# Patient Record
Sex: MALE | Race: WHITE | NOT HISPANIC OR LATINO | Employment: OTHER | URBAN - METROPOLITAN AREA
[De-identification: names, ages, dates, MRNs, and addresses within clinical notes are randomized per-mention and may not be internally consistent; named-entity substitution may affect disease eponyms.]

---

## 2017-05-05 ENCOUNTER — PATIENT OUTREACH (OUTPATIENT)
Dept: HEALTH INFORMATION MANAGEMENT | Facility: OTHER | Age: 72
End: 2017-05-05

## 2017-05-05 NOTE — PROGRESS NOTES
5/5/17     Verify PCP: yes    Communication Preference Obtained: yes     Review Care Team: yes    Annual Wellness Visit Scheduling  1. Scheduling Status:Not Scheduled. Patient states they are no longer with PCP. [facility]         MyChart Activation: already active

## 2017-07-20 ENCOUNTER — SLEEP CENTER VISIT (OUTPATIENT)
Dept: SLEEP MEDICINE | Facility: MEDICAL CENTER | Age: 72
End: 2017-07-20
Payer: MEDICARE

## 2017-07-20 VITALS
BODY MASS INDEX: 27.09 KG/M2 | DIASTOLIC BLOOD PRESSURE: 90 MMHG | HEART RATE: 77 BPM | RESPIRATION RATE: 16 BRPM | HEIGHT: 72 IN | SYSTOLIC BLOOD PRESSURE: 162 MMHG | OXYGEN SATURATION: 95 % | WEIGHT: 200 LBS

## 2017-07-20 DIAGNOSIS — G47.33 OSA (OBSTRUCTIVE SLEEP APNEA): ICD-10-CM

## 2017-07-20 DIAGNOSIS — I10 ESSENTIAL HYPERTENSION: ICD-10-CM

## 2017-07-20 PROCEDURE — 99203 OFFICE O/P NEW LOW 30 MIN: CPT | Performed by: NURSE PRACTITIONER

## 2017-07-20 NOTE — PROGRESS NOTES
Chief Complaint   Patient presents with   • Establish Care     [facility]        HPI:  Glen Chisholm II is a 72 y.o. year old male here today to establish care for his sleep apnea. He states he [facility] which indicated evidence of sleep apnea. However, I do not have records of his sleep study to review. He does snore at night. He is unsure of witnessed apneas as he lives alone. He notes occasional waking gasping for air. He tends to fall asleep easily. He does use Melatonin OTC. He does wake up 1-2 times to urinate. He goes to bed and 9 pm and wakes at 5 am. He tends to take an afternoon nap. He tends to wake un refreshed. He is tired during the day. He admits to nodding off when sedentary. He denies any morning headaches. He states he has had these symptoms for the past 7 years. He states he has chronic back pain and is pending back surgery. He states he was recommended a sleep apnea evaluation prior to surgery.   He denies chest pain. [facility]. He had an unremarkable holter monitor. He states he cut back on caffeine and his palpitations have improved. He does have a history of hypertension and is on medication for this. He has a history of tobacco abuse and quit smoking in 1984. He notes mild dyspnea with stairs. He denies current cough, mucous or wheeze. He states he has been told he has COPD, but is unsure of whether or not he has had lung function testing.       Past Medical History   Diagnosis Date   • Esophageal stricture    • Liver cyst    • Hearing loss      low frequency   • Tinnitus    • Dizziness      orthostatic   • Herpes simplex type 1 infection    • Hiatal hernia    • Hypertension    • Indigestion    • Sleep apnea    • Snoring    • Low back pain 3/27/12     5/10   • Pain      Left Shoulder   • Heart burn    • Cataract    • Thyroid disease    • Arthritis        Past Surgical History   Procedure Laterality Date   •  Cyst excision       ganglion   • Tonsillectomy     • Esophagus dilation-balloon >30       multiple   • Eye surgery       right weak eye muscles ( two at age 4 and one at age 25)   • Debridement  8/17/2009     Performed by BOONE EDWARDS at SURGERY SAME DAY [facility] ORS   • Lumbar decompression  4/5/2012     Performed by ARAMIS MATTHEWS at SURGERY [facility] ORS   • Lumbar laminectomy diskectomy  4/5/2012     Performed by ARAMIS MATTHEWS at SURGERY [facility] ORS   • Shoulder surgery  1999     right prosthesis   • Shoulder surgery  1966     right   • Shoulder surgery  1982     right   • Shoulder arthroscopy  8/17/2009     Performed by BOONE EDWARDS at SURGERY SAME DAY [facility] ORS   • Shoulder decompression  8/17/2009     Performed by BOONE EDWARDS at SURGERY SAME DAY [facility] ORS   • Rectus repair  8/8/2014     Performed by COTY Banks M.D. at SURGERY SURGICAL [facility] [place] ORS       Family History   Problem Relation Age of Onset   • Hypertension Mother    • Hyperlipidemia Sister    • Arthritis Sister      lupus   • Heart Disease Father      AAA or renal/iliac artery aneurysms   • Heart Disease Paternal Aunt      AAA or renal/iliac artery aneurysms   • Heart Disease Maternal Grandfather      AAA or renal/iliac artery aneurysms   • Heart Disease Maternal Grandmother    • Heart Disease Paternal Grandfather        Social History     Social History   • Marital Status: Single     Spouse Name: N/A   • Number of Children: N/A   • Years of Education: N/A     Occupational History   • Not on file.     Social History Main Topics   • Smoking status: Former Smoker -- 1.00 packs/day for 14 years     Types: Cigarettes     Quit date: 01/01/1984   • Smokeless tobacco: Never Used   • Alcohol Use: Yes      Comment: occ   • Drug Use: No   • Sexual Activity: Not on file     Other Topics Concern   • Not on file     Social History Narrative       ROS:  Constitutional: Denies fevers, chills, sweats, weight loss  Eyes: Denies vision loss,  pain, drainage, double vision. Wears glasses   Ears/Nose/Mouth/Throat: Denies rhinitis, nasal congestion, ear ache, difficulty hearing, sore throat, persistent hoarseness, decayed teeth/toothache  Cardiovascular: Denies chest pain, tightness, swelling in feet/legs, fainting, difficulty breathing when laying down. Positive palpitations   Respiratory: See HPI   GI: Denies heartburn, difficulty swallowing, nausea, vomiting, abdominal pain, diarrhea, constipation  : Denies frequent urination, painful urination  Integumentary: Denies rashes, lumps or color changes  MSK: Positive for back pain   Neurological: Denies frequent headaches, dizziness, weakness  Sleep: See HPI       Current Outpatient Prescriptions on File Prior to Visit   Medication Sig Dispense Refill   • meclizine (ANTIVERT) 25 MG Tab Take 1 Tab by mouth 3 times a day as needed. 30 Tab 0   • Esomeprazole Magnesium 20 MG Pack Take 20 mg by mouth every day.     • terazosin (HYTRIN) 2 MG Cap TAKE 1 CAPSULE ORALLY EVERY BEDTIME 90 Cap 3   • levothyroxine (SYNTHROID) 25 MCG Tab TAKE 1 TABLET BY MOUTH EVERY DAY. 90 Tab 3   • gabapentin (NEURONTIN) 300 MG CAPS Take 300 Caps by mouth 2 Times a Day.     • tramadol (ULTRAM) 50 MG TABS Take 50 mg by mouth every 8 hours as needed.     • acyclovir (ZOVIRAX) 400 MG tablet Take 1 Tab by mouth 2 times a day. 180 Tab 6   • CO Q 10 10 MG PO CAPS Take 1 Tab by mouth every day.       No current facility-administered medications on file prior to visit.     Codeine    Blood pressure 162/90, pulse 77, resp. rate 16, height 1.829 m (6'), weight 90.719 kg (200 lb), SpO2 95 %.  PE:   Appearance: Well developed, well nourished, no acute distress  Eyes: PERRL, EOM intact, sclera white, conjunctiva moist  Ears: no lesions or deformities  Hearing: grossly intact  Nose: no lesions or deformities  Oropharynx: tongue normal, posterior pharynx without erythema or exudate  Mallampati Classification: class 1   Neck: supple, trachea midline,  no masses   Respiratory effort: no intercostal retractions or use of accessory muscles  Lung auscultation: no rales, rhonchi or wheezes  Heart auscultation: no murmur rub or gallop  Extremities: no cyanosis or edema  Abdomen: soft ,non tender, no masses  Gait and Station: normal  Digits and nails: no clubbing, cyanosis, petechiae or nodes.  Cranial nerves: grossly intact  Skin: no rashes, lesions or ulcers noted  Orientation: Oriented to time, person and place  Mood and affect: mood and affect appropriate, normal interaction with examiner  Judgement: Intact          Assessment:  1. TORITO (obstructive sleep apnea)     2. Essential hypertension           Plan:    1) He states [facility] which indicated sleep [facility]nea. However, I do not have results of this. I have requested records from the [facility] which are still pending. He believes he has a copy of the study at home and will fax it to us. I did discuss the Pathophysiology of sleep apnea in detail including various treatment options. He is not interested in surgery. If he does have sleep apnea on his study then a trial of Auto CPAP would be recommended. Once records from the [facility] are received then we can review results over the phone and order treatment if indicated.   2) Sleep hygiene discussed in detail.  3) He had an unremarkable holter monitor. He states his palpitations have been better controlled with decreasing his caffeine intake. His BP was elevated today in the office, but he feels this is related to stress. He was unable to find the office for today's appointment and was late. He is on antihypertensives and is followed by Cardiology.  4) Arrange 6 week follow up, sooner if needed. If he does getting started on airway pressurization then he is encouraged to bring his chip to his next office visit.  5) Also request records in regards to his COPD work up. He states he was told he may have COPD. However, it is unclear of whether or not he has had  PFT's or chest imaging.           Answers for HPI/ROS submitted by the patient on 7/6/2017   Year of your last physical exam: 2017  Results of exam: Suffer from sleep apnea; have microscopic amount of blood in urine; may suffer from COPD  Occupation : Retired  Height: 6-0  Current weight: 207  6 months ago: 213  At age 18: 165  What is the reason for your visit today?: Sleep apnea  Name of person referring you to the Sleep Center: [facility]/Dr. Muñoz  Have you ever been hospitalized?: Yes  Reason, year, and hospital in which you were hospitalized:: [facility] has records  Have you ever had problems with anesthesia?: No  Have you experienced post-operative delirium?: No  Any complications with surgery?: No  What year did you receive your last Flu shot?: 2016  What year did you receive you last Pneumonia shot?: 2016  Have you had a TB skin test? If so, please list the year and result:: Not that I am aware  Have you had Allergy skin testing? If so, please list the year and result:: Age 8/Allergic to tomatoes then  but not now.  Please briefly describe your sleep problem and how old you were when it began.: I sleep restless @ night. During the afternoon, I feel fatigued and have to have a nap.  How does this affect your daily life and activities? Please also rate how serious of a problem this is (1 = Not at all, 10 = Very Serious).: 7  Have you had any previous evaluations, examinations, or treatment for this sleep problem or any other problems with your sleep? If so, please describe the evaluation, treatment, and results.: [facility]. Results of 533 minute study:  18 obstruftive & 12 mixed apneas; 28 obstructivehypopneas for apnea-hypopnea index of 6.5; max. heart rate of 110; hypertension & hyperlipidemia, etc.  Have you used any medications (prescribed or otherwise) to help your sleep problem? If yes, include name, amount, frequency, and the prescribing  physician.: Melatonin every night  & Sleep Aid occasionally  If employed, what time do you usually start and end work?: N/A  Do you ever change work shifts? If yes, describe how often (never, infrequently, regularly).: N/A  What time do you usually go to bed and wake up on: Weekdays? Weekends?: 9:00 pm to 5:00 am  Do you have a regular bed partner?: No  How many minutes does it usually take to fall asleep at night after turning off the lights?: Less than 10 minutes  What do you ordinarily do just prior to turning out the lights and attempting to go to sleep (e.g., reading, TV, baths, etc.)?: Watch TV  On average, how many times do you wake up during the night?: 2 to 3 times  On average, how many times do you wake up to use the bathroom?: Once  Do you often wake up too early in the morning and are unable to return to sleep?: Yes  On average, how many hours of sleep do you get per night?: 8  How do you usually awaken?  Alarm, spontaneously, or other?: Spontaneous  Is it difficult for you to awaken and get out of bed after sleeping? (Not at all, Sometimes, Very): Sometimes  Do you nap or return to bed after arising?: Yes  Are you bothered by sleepiness during the day?: Yes  Do you feel that you get too much sleep at night?: No  Do you feel that you get too little sleep at night?: Yes  Do you usually feel tired during the day? If so, what do you attribute this to?: Yes  Do you find yourself falling asleep when you don't mean to? : Yesj  If yes, how long does your sleep episode last?: 20 to 30 minutes  Do you feel rested or refreshed after the sleep episode?: Yes  Have you ever suddenly fallen?: Yes  Have you ever experienced sudden body weakness?: Yes  If yes, were you aware of the things around you?: Yes  Was the weakness brought on by any particular event or feeling? If so, briefly describe.: I have PVC's. I think it may be caused by drinking too much coffee (per Internet), so I cut back on coffee consumptions. I  "haven't had any light-headed moments since, but it has only been a couple months since cutting back on coffee.   Have you ever experienced weakness or paralysis upon going to sleep?: No  Have you ever experienced weakness or paralysis upon awakening from sleep?: No  Have you ever experienced seeing things or hearing voices/noises: That weren't real? On going to sleep? During the night? On awakening from sleep? During the day?: No  Do you have difficulty breathing at night? If yes, briefly describe.: About every 6 months, I wake up gasping for air.  How many times per week?: 1 every 6 months  How did you become aware of this, at what age did this first occur, and how many years has this occurred?: Age 66  Have you been told you snore while asleep? If so, does it disturb a bed partner (or someone in the same room), or someone in the next room?: Never been told that, but I live alone. The sleep study of June 21st, 2017 revealed that I do snore.  Have you ever experienced doing something without being aware of the action? If yes, please describe.: No  How many times per week does this occur?: N/A  Have you ever experienced upon lying in bed before sleep or on awakening from sleep: Restlessness of legs, \"nervous legs\", \"creeping crawling\" sensation of legs, or twitching of legs?: I suffer from neuropathy in both my feet. Feet are very numb, and there is slighter numbness all the way up to my knees. I have never suffered the other symptons; neuropathy is bad enough.  How many times per week does this occur, and how many minutes does the sensation last?: Neuropathy is permanent since laminectomy surgery in approx. 2010. Please confirm date, as surgery occurred @ [facility].  Does anything relieve the sensations (e.g., getting out of bed, medication, massage)?: Yes  At what age did this first occur, and how many years has this occurred?: Last answer relates only to neuropathy.  Walking around for a while helps out.  Have you " ever been told that your arms or legs jerk or twitch while you are asleep? If yes, how many times per night does this occur?: No  At what age did this first occur, and how many years has this occurred?: N/A  Do you know, or have you ever been told that you do any of the following while sleeping: talk, walk, grit teeth, wet the bed, wake up screaming or seemingly afraid, have disturbing dreams, have unusual movements, wake up with headaches, (males) have erections? If yes to any of these, please indicate how many times per week, age started, last occurrence, treatment received.: Not aware of any of these actions.  Has anyone in your family been known to have any sleep problems? If yes, please list the type of problem (e.g., trouble getting to sleep, too sleepy, bed wetting, etc.), the relationship of this person to you, and the treatment received.: Unknown. I don't have much family.      Answers for HPI/ROS submitted by the patient on 7/6/2017   Year of your last physical exam: 2017  Results of exam: Suffer from sleep apnea; have microscopic amount of blood in urine; may suffer from COPD  Occupation : Retired  Height: 6-0  Current weight: 207  6 months ago: 213  At age 18: 165  What is the reason for your visit today?: Sleep apnea  Name of person referring you to the Sleep Center: [facility]/Dr. Muñoz  Have you ever been hospitalized?: Yes  Reason, year, and hospital in which you were hospitalized:: [facility] has records  Have you ever had problems with anesthesia?: No  Have you experienced post-operative delirium?: No  Any complications with surgery?: No  What year did you receive your last Flu shot?: 2016  What year did you receive you last Pneumonia shot?: 2016  Have you had a TB skin test? If so, please list the year and result:: Not that I am aware  Have you had Allergy skin testing? If so, please list the year and result:: Age 8/Allergic to tomatoes then  but not now.  Please briefly describe  your sleep problem and how old you were when it began.: I sleep restless @ night. During the afternoon, I feel fatigued and have to have a nap.  How does this affect your daily life and activities? Please also rate how serious of a problem this is (1 = Not at all, 10 = Very Serious).: 7  Have you had any previous evaluations, examinations, or treatment for this sleep problem or any other problems with your sleep? If so, please describe the evaluation, treatment, and results.: [facility]. Results of 533 minute study:  18 obstruftive & 12 mixed apneas; 28 obstructivehypopneas for apnea-hypopnea index of 6.5; max. heart rate of 110; hypertension & hyperlipidemia, etc.  Have you used any medications (prescribed or otherwise) to help your sleep problem? If yes, include name, amount, frequency, and the prescribing physician.: Melatonin every night  & Sleep Aid occasionally  If employed, what time do you usually start and end work?: N/A  Do you ever change work shifts? If yes, describe how often (never, infrequently, regularly).: N/A  What time do you usually go to bed and wake up on: Weekdays? Weekends?: 9:00 pm to 5:00 am  Do you have a regular bed partner?: No  How many minutes does it usually take to fall asleep at night after turning off the lights?: Less than 10 minutes  What do you ordinarily do just prior to turning out the lights and attempting to go to sleep (e.g., reading, TV, baths, etc.)?: Watch TV  On average, how many times do you wake up during the night?: 2 to 3 times  On average, how many times do you wake up to use the bathroom?: Once  Do you often wake up too early in the morning and are unable to return to sleep?: Yes  On average, how many hours of sleep do you get per night?: 8  How do you usually awaken?  Alarm, spontaneously, or other?: Spontaneous  Is it difficult for you to awaken and get out of bed after sleeping? (Not at all, Sometimes, Very): Sometimes  Do  you nap or return to bed after arising?: Yes  Are you bothered by sleepiness during the day?: Yes  Do you feel that you get too much sleep at night?: No  Do you feel that you get too little sleep at night?: Yes  Do you usually feel tired during the day? If so, what do you attribute this to?: Yes  Do you find yourself falling asleep when you don't mean to? : Yesj  If yes, how long does your sleep episode last?: 20 to 30 minutes  Do you feel rested or refreshed after the sleep episode?: Yes  Have you ever suddenly fallen?: Yes  Have you ever experienced sudden body weakness?: Yes  If yes, were you aware of the things around you?: Yes  Was the weakness brought on by any particular event or feeling? If so, briefly describe.: I have PVC's. I think it may be caused by drinking too much coffee (per Internet), so I cut back on coffee consumptions. I haven't had any light-headed moments since, but it has only been a couple months since cutting back on coffee.   Have you ever experienced weakness or paralysis upon going to sleep?: No  Have you ever experienced weakness or paralysis upon awakening from sleep?: No  Have you ever experienced seeing things or hearing voices/noises: That weren't real? On going to sleep? During the night? On awakening from sleep? During the day?: No  Do you have difficulty breathing at night? If yes, briefly describe.: About every 6 months, I wake up gasping for air.  How many times per week?: 1 every 6 months  How did you become aware of this, at what age did this first occur, and how many years has this occurred?: Age 66  Have you been told you snore while asleep? If so, does it disturb a bed partner (or someone in the same room), or someone in the next room?: Never been told that, but I live alone. The sleep study of June 21st, 2017 revealed that I do snore.  Have you ever experienced doing something without being aware of the action? If yes, please describe.: No  How many times per week does  "this occur?: N/A  Have you ever experienced upon lying in bed before sleep or on awakening from sleep: Restlessness of legs, \"nervous legs\", \"creeping crawling\" sensation of legs, or twitching of legs?: I suffer from neuropathy in both my feet. Feet are very numb, and there is slighter numbness all the way up to my knees. I have never suffered the other symptons; neuropathy is bad enough.  How many times per week does this occur, and how many minutes does the sensation last?: Neuropathy is permanent since laminectomy surgery in approx. 2010. Please confirm date, as surgery occurred @ [facility].  Does anything relieve the sensations (e.g., getting out of bed, medication, massage)?: Yes  At what age did this first occur, and how many years has this occurred?: Last answer relates only to neuropathy.  Walking around for a while helps out.  Have you ever been told that your arms or legs jerk or twitch while you are asleep? If yes, how many times per night does this occur?: No  At what age did this first occur, and how many years has this occurred?: N/A  Do you know, or have you ever been told that you do any of the following while sleeping: talk, walk, grit teeth, wet the bed, wake up screaming or seemingly afraid, have disturbing dreams, have unusual movements, wake up with headaches, (males) have erections? If yes to any of these, please indicate how many times per week, age started, last occurrence, treatment received.: Not aware of any of these actions.  Has anyone in your family been known to have any sleep problems? If yes, please list the type of problem (e.g., trouble getting to sleep, too sleepy, bed wetting, etc.), the relationship of this person to you, and the treatment received.: Unknown. I don't have much family.      "

## 2017-07-20 NOTE — MR AVS SNAPSHOT
Glen Chisholm II   2017 9:40 AM   Sleep Center Visit   MRN: [id number]    Department:  [facility]   Dept Phone:  [phone]    Description:  Male : 1945   Provider:  EWA Spicer           Reason for Visit     [facility]       Allergies as of 2017     Allergen Noted Reactions    Codeine 2009   Nausea    Not a true allergy  Tolerated Oxycodone 14       You were diagnosed with     TORITO (obstructive sleep apnea)   [920051]       Essential hypertension   [6498740]         Vital Signs     Blood Pressure Pulse Respirations Height Weight Body Mass Index    162/90 mmHg 77 16 1.829 m (6') 90.719 kg (200 lb) 27.12 kg/m2    Oxygen Saturation Smoking Status                95% Former Smoker          Basic Information     Date Of Birth Sex Race Ethnicity Preferred Language    1945 Male White Non- English      Problem List              ICD-10-CM Priority Class Noted - Resolved    Health maintenance examination Z00.00   2009 - Present    Peripheral neuropathy G62.9   2010 - Present    Low back pain M54.5   2010 - Present    Hyperlipidemia with target LDL less than 100 E78.5   2010 - Present    Hypertropia [id number]   2014 - Present    Hypothyroid E03.9   2015 - Present    Cataract H26.9   2015 - Present    Dehydration E86.0   2015 - Present    Cephalgia R51   2015 - Present    Vertigo R42   2015 - Present    TORITO (obstructive sleep apnea) G47.33   2017 - Present      Health Maintenance        Date Due Completion Dates    COLONOSCOPY 1993 ---    IMM ZOSTER VACCINE 2003 ---    IMM PNEUMOCOCCAL 65+ (ADULT) LOW/MEDIUM RISK SERIES (1 of 2 - PCV13) 2008 ---    IMM INFLUENZA (1) 2017 ---    IMM DTaP/Tdap/Td Vaccine (2 - Td) 2022            Current Immunizations     Tdap Vaccine 2012      Below and/or attached are the medications your provider  expects you to take. Review all of your home medications and newly ordered medications with your provider and/or pharmacist. Follow medication instructions as directed by your provider and/or pharmacist. Please keep your medication list with you and share with your provider. Update the information when medications are discontinued, doses are changed, or new medications (including over-the-counter products) are added; and carry medication information at all times in the event of emergency situations     Allergies:  CODEINE - Nausea               Medications  Valid as of: July 20, 2017 - 10:26 AM    Generic Name Brand Name Tablet Size Instructions for use    Acyclovir (Tab) ZOVIRAX 400 MG Take 1 Tab by mouth 2 times a day.        Coenzyme Q10 (Cap) Co Q 10 10 MG Take 1 Tab by mouth every day.        Esomeprazole Magnesium (Pack) Esomeprazole Magnesium 20 MG Take 20 mg by mouth every day.        Gabapentin (Cap) NEURONTIN 300 MG Take 300 Caps by mouth 2 Times a Day.        Levothyroxine Sodium (Tab) SYNTHROID 25 MCG TAKE 1 TABLET BY MOUTH EVERY DAY.        Meclizine HCl (Tab) ANTIVERT 25 MG Take 1 Tab by mouth 3 times a day as needed.        Terazosin HCl (Cap) HYTRIN 2 MG TAKE 1 CAPSULE ORALLY EVERY BEDTIME        TraMADol HCl (Tab) ULTRAM 50 MG Take 50 mg by mouth every 8 hours as needed.        .                 Medicines prescribed today were sent to:     [id number] - [place] - [address]    [address] [place] [address]    Phone: [phone] Fax: [phone]    Open 24 Hours?: No      Medication refill instructions:       If your prescription bottle indicates you have medication refills left, it is not necessary to call your provider’s office. Please contact your pharmacy and they will refill your medication.    If your prescription bottle indicates you do not have any refills left, you may request refills at any time through one of the following ways: The online [employer] system (except  Urgent Care), by calling your provider’s office, or by asking your pharmacy to contact your provider’s office with a refill request. Medication refills are processed only during regular business hours and may not be available until the next business day. Your provider may request additional information or to have a follow-up visit with you prior to refilling your medication.   *Please Note: Medication refills are assigned a new Rx number when refilled electronically. Your pharmacy may indicate that no refills were authorized even though a new prescription for the same medication is available at the pharmacy. Please request the medicine by name with the pharmacy before contacting your provider for a refill.           [employer] Access Code: Activation code not generated  Current [employer] Status: Active

## 2017-07-21 ENCOUNTER — TELEPHONE (OUTPATIENT)
Dept: SLEEP MEDICINE | Facility: MEDICAL CENTER | Age: 72
End: 2017-07-21

## 2017-07-21 NOTE — PATIENT INSTRUCTIONS
Plan:    1) He states [facility] which indicated [facility] apnea. However, I do not have results of this. I have requested [facility] the [facility] which are still pending. He believes he has a copy of the study at home and will fax it to us. I did discuss the Pathophysiology of sleep apnea in detail including various treatment options. He is not interested in surgery. If he does have sleep apnea on his study then a trial of Auto CPAP would be recommended. Once [facility] the [facility] are received then we can review results over the phone and order treatment if indicated.   2) Sleep hygiene discussed in detail.  3) He had an unremarkable holter monitor. He states his palpitations have been better controlled with decreasing his caffeine intake. His BP was elevated today in the office, but he feels this is related to stress. He was unable to find the office for today's appointment and was late. He is on antihypertensives and is followed by Cardiology.  4) Arrange 6 week follow up, sooner if needed. If he does getting started on airway pressurization then he is encouraged to bring his chip to his next office visit.  5) Also request records in regards to his COPD work up. He states he was told he may have COPD. However, it is unclear of whether or not he has had PFT's or chest imaging.

## 2017-07-21 NOTE — TELEPHONE ENCOUNTER
Patient states he was going to fax a copy of his [facility] and I would review with him on the phone. Ester also requested a copy [facility] the [facility] when he was seen in the office. However, I still do not have a copy of his study. Could you check to see if there is a fax at the Pulmonary office from his sleep study?

## 2017-07-24 NOTE — TELEPHONE ENCOUNTER
T/C with patient.   I spoke with Glen regarding results of his home sleep [facility] which indicates mild sleep apnea with an AHI of 6.5 with a low 02 saturation of 87%. Events were increased in supine. Records indicate that he was ordered a [facility]AP through the [facility]. He states he would rather get his machine and follow [facility] through [facility]. [facility] to [facility]. He does not need to see us back routinely. He will call us if he wants to follow with us in the future.

## 2017-10-30 ENCOUNTER — APPOINTMENT (RX ONLY)
Dept: URBAN - METROPOLITAN AREA CLINIC 20 | Facility: CLINIC | Age: 72
Setting detail: DERMATOLOGY
End: 2017-10-30

## 2017-10-30 DIAGNOSIS — L82.1 OTHER SEBORRHEIC KERATOSIS: ICD-10-CM

## 2017-10-30 DIAGNOSIS — L98.8 OTHER SPECIFIED DISORDERS OF THE SKIN AND SUBCUTANEOUS TISSUE: ICD-10-CM

## 2017-10-30 DIAGNOSIS — L91.8 OTHER HYPERTROPHIC DISORDERS OF THE SKIN: ICD-10-CM

## 2017-10-30 DIAGNOSIS — L57.0 ACTINIC KERATOSIS: ICD-10-CM

## 2017-10-30 DIAGNOSIS — D485 NEOPLASM OF UNCERTAIN BEHAVIOR OF SKIN: ICD-10-CM

## 2017-10-30 DIAGNOSIS — D18.0 HEMANGIOMA: ICD-10-CM

## 2017-10-30 DIAGNOSIS — L81.4 OTHER MELANIN HYPERPIGMENTATION: ICD-10-CM

## 2017-10-30 PROBLEM — D48.5 NEOPLASM OF UNCERTAIN BEHAVIOR OF SKIN: Status: ACTIVE | Noted: 2017-10-30

## 2017-10-30 PROBLEM — D18.01 HEMANGIOMA OF SKIN AND SUBCUTANEOUS TISSUE: Status: ACTIVE | Noted: 2017-10-30

## 2017-10-30 PROBLEM — E78.5 HYPERLIPIDEMIA, UNSPECIFIED: Status: ACTIVE | Noted: 2017-10-30

## 2017-10-30 PROCEDURE — 69100 BIOPSY OF EXTERNAL EAR: CPT | Mod: 59

## 2017-10-30 PROCEDURE — 99202 OFFICE O/P NEW SF 15 MIN: CPT | Mod: 25

## 2017-10-30 PROCEDURE — ? BIOPSY BY SHAVE METHOD

## 2017-10-30 PROCEDURE — ? LIQUID NITROGEN

## 2017-10-30 PROCEDURE — ? BENIGN DESTRUCTION

## 2017-10-30 PROCEDURE — ? COUNSELING

## 2017-10-30 ASSESSMENT — LOCATION DETAILED DESCRIPTION DERM
LOCATION DETAILED: LEFT MEDIAL SUPERIOR CHEST
LOCATION DETAILED: RIGHT INFERIOR POSTERIOR HELIX
LOCATION DETAILED: SUPERIOR THORACIC SPINE
LOCATION DETAILED: LEFT MEDIAL HEEL
LOCATION DETAILED: LEFT INFERIOR UPPER BACK
LOCATION DETAILED: RIGHT INFERIOR UPPER BACK
LOCATION DETAILED: STERNUM
LOCATION DETAILED: RIGHT CENTRAL POSTERIOR NECK
LOCATION DETAILED: RIGHT MEDIAL HEEL
LOCATION DETAILED: RIGHT INFERIOR VERMILION LIP
LOCATION DETAILED: NASAL DORSUM
LOCATION DETAILED: RIGHT SUPERIOR PARIETAL SCALP
LOCATION DETAILED: RIGHT MEDIAL SUPERIOR CHEST

## 2017-10-30 ASSESSMENT — LOCATION ZONE DERM
LOCATION ZONE: EAR
LOCATION ZONE: NECK
LOCATION ZONE: FEET
LOCATION ZONE: LIP
LOCATION ZONE: TRUNK
LOCATION ZONE: NOSE
LOCATION ZONE: SCALP

## 2017-10-30 ASSESSMENT — LOCATION SIMPLE DESCRIPTION DERM
LOCATION SIMPLE: RIGHT UPPER BACK
LOCATION SIMPLE: CHEST
LOCATION SIMPLE: UPPER BACK
LOCATION SIMPLE: NECK
LOCATION SIMPLE: LEFT UPPER BACK
LOCATION SIMPLE: RIGHT EAR
LOCATION SIMPLE: SCALP
LOCATION SIMPLE: LEFT FOOT
LOCATION SIMPLE: RIGHT FOOT
LOCATION SIMPLE: NOSE
LOCATION SIMPLE: RIGHT LIP

## 2017-10-30 NOTE — PROCEDURE: BIOPSY BY SHAVE METHOD
Anesthesia Volume In Cc: 0.5
Bill For Surgical Tray: no
Hemostasis: Drysol and Electrocautery
Cryotherapy Text: The wound bed was treated with cryotherapy after the biopsy was performed.
Silver Nitrate Text: The wound bed was treated with silver nitrate after the biopsy was performed.
Additional Anesthesia Volume In Cc (Will Not Render If 0): 0
Detail Level: Detailed
Biopsy Type: H and E
Notification Instructions: Patient will be notified of biopsy results. However, patient instructed to call the office if not contacted within 2 weeks.
Electrodesiccation Text: The wound bed was treated with electrodesiccation after the biopsy was performed.
Anesthesia Type: 1% lidocaine with 1:100,000 epinephrine and 408mcg clindamycin/ml and a 1:10 solution of 8.4% sodium bicarbonate
Lab Facility: 
Type Of Destruction Used: Curettage
Dressing: Band-Aid
Biopsy Method: Personna blade
Consent: Written consent was obtained and risks were reviewed including but not limited to scarring, infection, bleeding, scabbing, incomplete removal, nerve damage and allergy to anesthesia.
Lab: [id number]
Billing Type: Third-Party Bill
Curettage Text: The wound bed was treated with curettage after the biopsy was performed.
Wound Care: Vaseline
Electrodesiccation And Curettage Text: The wound bed was treated with electrodesiccation and curettage after the biopsy was performed.
Post-Care Instructions: I reviewed with the patient in detail post-care instructions. Patient is to keep the biopsy site dry overnight, and then apply bacitracin twice daily until healed. Patient may apply hydrogen peroxide soaks to remove any crusting.

## 2017-10-30 NOTE — PROCEDURE: LIQUID NITROGEN
Number Of Freeze-Thaw Cycles: 1 freeze-thaw cycle
Duration Of Freeze Thaw-Cycle (Seconds): 3
Render Post-Care Instructions In Note?: no
Detail Level: Detailed
Post-Care Instructions: I reviewed with the patient in detail post-care instructions. Patient is to wear sunprotection, and avoid picking at any of the treated lesions. Pt may apply Vaseline to crusted or scabbing areas.
Consent: The patient's consent was obtained including but not limited to risks of crusting, scabbing, blistering, scarring, darker or lighter pigmentary change, recurrence, incomplete removal and infection.

## 2017-10-30 NOTE — HPI: SKIN CHECK
What Is The Reason For Today's Visit?: Skin Lesions
Additional History: Would like back looked at, has had LN2 with previous PCP, [facility]. \\nCheck feet, has cracking and dryness.

## 2017-10-30 NOTE — PROCEDURE: BENIGN DESTRUCTION
Anesthesia Volume In Cc: 0.5
Medical Necessity Clause: This procedure was medically necessary because the lesions that were treated were:
Bill Insurance (You Assume Risk Of Denial Or Audit By Selecting Yes): No
Medical Necessity Information: It is in your best interest to select a reason for this procedure from the list below. All of these items fulfill various CMS LCD requirements except the new and changing color options.
Detail Level: Detailed
Post-Care Instructions: I reviewed with the patient in detail post-care instructions. Patient is to wear sunprotection, and avoid picking at any of the treated lesions. Pt may apply Vaseline to crusted or scabbing areas.
Consent: The patient's consent was obtained including but not limited to risks of crusting, scabbing, blistering, scarring, darker or lighter pigmentary change, recurrence, incomplete removal and infection.

## 2018-01-03 ENCOUNTER — APPOINTMENT (RX ONLY)
Dept: URBAN - METROPOLITAN AREA CLINIC 20 | Facility: CLINIC | Age: 73
Setting detail: DERMATOLOGY
End: 2018-01-03

## 2018-01-03 DIAGNOSIS — L82.0 INFLAMED SEBORRHEIC KERATOSIS: ICD-10-CM

## 2018-01-03 DIAGNOSIS — L90.5 SCAR CONDITIONS AND FIBROSIS OF SKIN: ICD-10-CM

## 2018-01-03 PROBLEM — I10 ESSENTIAL (PRIMARY) HYPERTENSION: Status: ACTIVE | Noted: 2018-01-03

## 2018-01-03 PROCEDURE — 99213 OFFICE O/P EST LOW 20 MIN: CPT | Mod: 25

## 2018-01-03 PROCEDURE — ? OBSERVATION

## 2018-01-03 PROCEDURE — 17110 DESTRUCTION B9 LES UP TO 14: CPT

## 2018-01-03 PROCEDURE — ? LIQUID NITROGEN

## 2018-01-03 PROCEDURE — ? COUNSELING

## 2018-01-03 ASSESSMENT — LOCATION DETAILED DESCRIPTION DERM
LOCATION DETAILED: LEFT SUPERIOR UPPER BACK
LOCATION DETAILED: INFERIOR THORACIC SPINE
LOCATION DETAILED: RIGHT MID-UPPER BACK
LOCATION DETAILED: LEFT MID-UPPER BACK
LOCATION DETAILED: NASAL DORSUM
LOCATION DETAILED: RIGHT SUPERIOR MEDIAL UPPER BACK
LOCATION DETAILED: RIGHT INFERIOR UPPER BACK
LOCATION DETAILED: RIGHT CLAVICULAR NECK
LOCATION DETAILED: LEFT CLAVICULAR NECK

## 2018-01-03 ASSESSMENT — LOCATION SIMPLE DESCRIPTION DERM
LOCATION SIMPLE: RIGHT ANTERIOR NECK
LOCATION SIMPLE: RIGHT UPPER BACK
LOCATION SIMPLE: LEFT UPPER BACK
LOCATION SIMPLE: UPPER BACK
LOCATION SIMPLE: LEFT ANTERIOR NECK
LOCATION SIMPLE: NOSE

## 2018-01-03 ASSESSMENT — LOCATION ZONE DERM
LOCATION ZONE: NOSE
LOCATION ZONE: NECK
LOCATION ZONE: TRUNK

## 2018-05-04 ENCOUNTER — APPOINTMENT (OUTPATIENT)
Dept: RADIOLOGY | Facility: MEDICAL CENTER | Age: 73
End: 2018-05-04
Attending: EMERGENCY MEDICINE
Payer: MEDICARE

## 2018-05-04 ENCOUNTER — HOSPITAL ENCOUNTER (EMERGENCY)
Facility: MEDICAL CENTER | Age: 73
End: 2018-05-04
Attending: EMERGENCY MEDICINE
Payer: MEDICARE

## 2018-05-04 VITALS
SYSTOLIC BLOOD PRESSURE: 113 MMHG | BODY MASS INDEX: 28.71 KG/M2 | HEART RATE: 80 BPM | DIASTOLIC BLOOD PRESSURE: 78 MMHG | OXYGEN SATURATION: 96 % | WEIGHT: 212 LBS | RESPIRATION RATE: 20 BRPM | TEMPERATURE: 99.1 F | HEIGHT: 72 IN

## 2018-05-04 DIAGNOSIS — M17.12 PRIMARY OSTEOARTHRITIS OF LEFT KNEE: ICD-10-CM

## 2018-05-04 DIAGNOSIS — R55 NEAR SYNCOPE: ICD-10-CM

## 2018-05-04 LAB
ANION GAP SERPL CALC-SCNC: 11 MMOL/L (ref 0–11.9)
BASOPHILS # BLD AUTO: 0.2 % (ref 0–1.8)
BASOPHILS # BLD: 0.02 K/UL (ref 0–0.12)
BUN SERPL-MCNC: 18 MG/DL (ref 8–22)
CALCIUM SERPL-MCNC: 9.6 MG/DL (ref 8.5–10.5)
CHLORIDE SERPL-SCNC: 104 MMOL/L (ref 96–112)
CO2 SERPL-SCNC: 23 MMOL/L (ref 20–33)
CREAT SERPL-MCNC: 1.26 MG/DL (ref 0.5–1.4)
EKG IMPRESSION: NORMAL
EOSINOPHIL # BLD AUTO: 0.02 K/UL (ref 0–0.51)
EOSINOPHIL NFR BLD: 0.2 % (ref 0–6.9)
ERYTHROCYTE [DISTWIDTH] IN BLOOD BY AUTOMATED COUNT: 45.7 FL (ref 35.9–50)
GLUCOSE SERPL-MCNC: 225 MG/DL (ref 65–99)
HCT VFR BLD AUTO: 47.7 % (ref 42–52)
HGB BLD-MCNC: 16.2 G/DL (ref 14–18)
IMM GRANULOCYTES # BLD AUTO: 0.04 K/UL (ref 0–0.11)
IMM GRANULOCYTES NFR BLD AUTO: 0.4 % (ref 0–0.9)
LYMPHOCYTES # BLD AUTO: 0.37 K/UL (ref 1–4.8)
LYMPHOCYTES NFR BLD: 4.1 % (ref 22–41)
MCH RBC QN AUTO: 29.4 PG (ref 27–33)
MCHC RBC AUTO-ENTMCNC: 34 G/DL (ref 33.7–35.3)
MCV RBC AUTO: 86.6 FL (ref 81.4–97.8)
MONOCYTES # BLD AUTO: 0.54 K/UL (ref 0–0.85)
MONOCYTES NFR BLD AUTO: 6 % (ref 0–13.4)
NEUTROPHILS # BLD AUTO: 8.08 K/UL (ref 1.82–7.42)
NEUTROPHILS NFR BLD: 89.1 % (ref 44–72)
NRBC # BLD AUTO: 0 K/UL
NRBC BLD-RTO: 0 /100 WBC
PLATELET # BLD AUTO: 131 K/UL (ref 164–446)
PMV BLD AUTO: 9.3 FL (ref 9–12.9)
POTASSIUM SERPL-SCNC: 4.5 MMOL/L (ref 3.6–5.5)
RBC # BLD AUTO: 5.51 M/UL (ref 4.7–6.1)
SODIUM SERPL-SCNC: 138 MMOL/L (ref 135–145)
TROPONIN I SERPL-MCNC: <0.01 NG/ML (ref 0–0.04)
WBC # BLD AUTO: 9.1 K/UL (ref 4.8–10.8)

## 2018-05-04 PROCEDURE — 36415 COLL VENOUS BLD VENIPUNCTURE: CPT

## 2018-05-04 PROCEDURE — A9270 NON-COVERED ITEM OR SERVICE: HCPCS | Performed by: EMERGENCY MEDICINE

## 2018-05-04 PROCEDURE — 93005 ELECTROCARDIOGRAM TRACING: CPT

## 2018-05-04 PROCEDURE — 73564 X-RAY EXAM KNEE 4 OR MORE: CPT | Mod: LT

## 2018-05-04 PROCEDURE — 700105 HCHG RX REV CODE 258: Performed by: EMERGENCY MEDICINE

## 2018-05-04 PROCEDURE — 700102 HCHG RX REV CODE 250 W/ 637 OVERRIDE(OP): Performed by: EMERGENCY MEDICINE

## 2018-05-04 PROCEDURE — 84484 ASSAY OF TROPONIN QUANT: CPT

## 2018-05-04 PROCEDURE — 99284 EMERGENCY DEPT VISIT MOD MDM: CPT

## 2018-05-04 PROCEDURE — 80048 BASIC METABOLIC PNL TOTAL CA: CPT

## 2018-05-04 PROCEDURE — 93005 ELECTROCARDIOGRAM TRACING: CPT | Performed by: EMERGENCY MEDICINE

## 2018-05-04 PROCEDURE — 85025 COMPLETE CBC W/AUTO DIFF WBC: CPT

## 2018-05-04 RX ORDER — SODIUM CHLORIDE 9 MG/ML
1000 INJECTION, SOLUTION INTRAVENOUS CONTINUOUS
Status: ACTIVE | OUTPATIENT
Start: 2018-05-04 | End: 2018-05-04

## 2018-05-04 RX ORDER — TRAMADOL HYDROCHLORIDE 50 MG/1
100 TABLET ORAL ONCE
Status: COMPLETED | OUTPATIENT
Start: 2018-05-04 | End: 2018-05-04

## 2018-05-04 RX ADMIN — SODIUM CHLORIDE 1000 ML: 9 INJECTION, SOLUTION INTRAVENOUS at 18:25

## 2018-05-04 RX ADMIN — TRAMADOL HYDROCHLORIDE 100 MG: 50 TABLET, COATED ORAL at 18:25

## 2018-05-04 ASSESSMENT — PAIN SCALES - GENERAL: PAINLEVEL_OUTOF10: 5

## 2018-05-04 ASSESSMENT — LIFESTYLE VARIABLES: DO YOU DRINK ALCOHOL: NO

## 2018-05-05 NOTE — ED TRIAGE NOTES
".  Chief Complaint   Patient presents with   • Near Syncopal   • Knee Pain     Left   Pt BIB EMS from home.  Pt had near syncopal event at home after having \" steroid injection - sacral/iliac joint \" at the [facility]. Pt had L4/L5 surgery(fusion) on January 19, 2018.  Pt reports no chest pain, no headache. Pt also c/o increased left knee pain(chronic injury), exacerbated today.  FSBS .    "

## 2018-05-05 NOTE — ED PROVIDER NOTES
ED Provider Note    CHIEF COMPLAINT  Chief Complaint   Patient presents with   • Near Syncopal   • Knee Pain     Left       HPI  Glen Chisholm II is a 72 y.o. male who presents to emergency room today came in by ambulance with near syncopal episode and left knee pain. Patient. At the [facility] today he had received a steroid injection for back pain. He was driven home by his friend when he got home he was tingling and lightheaded and almost passed out. No nausea no vomiting no chest pain or shortness breath no fever or chills. When he felt like he was going to pass out and went down to his left knee he has history of osteoarthritis to the area but has pain now to it, radiates across his knee worse with movement or palpation.    REVIEW OF SYSTEMS  See HPI for further details. All other systems are negative.     PAST MEDICAL HISTORY  Past Medical History:   Diagnosis Date   • Low back pain 3/27/12    5/10   • Arthritis    • Cataract    • Dizziness     orthostatic   • Esophageal stricture    • Hearing loss     low frequency   • Heart burn    • Herpes simplex type 1 infection    • Hiatal hernia    • Hypertension    • Indigestion    • Liver cyst    • Pain     Left Shoulder   • Sleep apnea    • Snoring    • Thyroid disease    • Tinnitus        FAMILY HISTORY  [unfilled]    SOCIAL HISTORY  Social History     Social History   • Marital status: Single     Spouse name: N/A   • Number of children: N/A   • Years of education: N/A     Social History Main Topics   • Smoking status: Former Smoker     Packs/day: 1.00     Years: 14.00     Types: Cigarettes     Quit date: 1/1/1984   • Smokeless tobacco: Never Used   • Alcohol use Yes      Comment: occ   • Drug use: No   • Sexual activity: Not on file     Other Topics Concern   • Not on file     Social History Narrative   • No narrative on file       SURGICAL HISTORY  Past Surgical History:   Procedure Laterality Date   • RECTUS REPAIR  8/8/2014    Performed by COTY Banks M.D. at  [facility]   • LUMBAR DECOMPRESSION  4/5/2012    Performed by ARAMIS MATTHEWS at SUR[facility]   • LUMBAR LAMINECTOMY DISKECTOMY  4/5/2012    Performed by ARAMIS MATTHEWS at [facility]   • DEBRIDEMENT  8/17/2009    Performed by BOONE EDWARDS at SURGERY [facility] DAY [facility]   • SHOULDER ARTHROSCOPY  8/17/2009    Performed by BOONE EDWARDS at SURGERY SAME DAY [facility]   • SHOULDER DECOMPRESSION  8/17/2009    Performed by BOONE EDWARDS at SURGERY [facility] DAY [facility]   • SHOULDER SURGERY  1999    right prosthesis   • SHOULDER SURGERY  1982    right   • SHOULDER SURGERY  1966    right   • CYST EXCISION      ganglion   • ESOPHAGUS DILATION-BALLOON >30      multiple   • EYE SURGERY      right weak eye muscles ( two at age 4 and one at age 25)   • TONSILLECTOMY         CURRENT MEDICATIONS  Home Medications    **Home medications have not yet been reviewed for this encounter**         ALLERGIES  Allergies   Allergen Reactions   • Codeine Nausea     Not a true allergy  Tolerated Oxycodone 08/08/14        PHYSICAL EXAM  VITAL SIGNS: /78   Pulse 100   Temp 37.3 °C (99.1 °F)   Resp 20   Ht 1.829 m (6')   Wt 96.2 kg (212 lb)   SpO2 95%   BMI 28.75 kg/m²  Room air O2: 95    Constitutional: Well developed, Well nourished, mild distress, Non-toxic appearance.   HENT: Normocephalic, Atraumatic, Bilateral external ears normal, Oropharynx moist, No oral exudates, Nose normal.   Eyes: PERRLA, EOMI, Conjunctiva normal, No discharge.   Neck: Normal range of motion, No tenderness, Supple, No stridor.   Lymphatic: No lymphadenopathy noted.   Cardiovascular: Normal heart rate, Normal rhythm, No murmurs, No rubs, No gallops.   Thorax & Lungs: Normal breath sounds, No respiratory distress, No wheezing, No chest tenderness.   Abdomen: Bowel sounds normal, Soft, No tenderness, No masses, No pulsatile masses.   Skin: Warm, Dry, No erythema, No rash.   Back: No tenderness, No CVA  tenderness.    Extremities: Intact distal pulses, No edema, left knee tenderness, No cyanosis, No clubbing. There is no deformity to the left knee there is slight edema no redness or signs of infection.  Musculoskeletal: Good range of motion in all major joints. No tenderness to palpation or major deformities noted.   Neurologic: Alert & oriented x 3, Normal motor function, Normal sensory function, No focal deficits noted.   Psychiatric: Affect normal, Judgment normal, Mood normal.     EKG  Normal sinus rhythm at 100 beats per minute, no ST elevation or depression, no widening of the QRS complex, poor R-wave progression, OK intervals are normal, no diagnostic Q waves noted. Is a 12-lead EKG there is no previous EKG available at this time for comparison.    RADIOLOGY/PROCEDURES  DX-KNEE COMPLETE 4+ LEFT   Final Result      1.  No fracture      2.  Moderate-severe tricompartmental osteoarthritis            COURSE & MEDICAL DECISION MAKING  Pertinent Labs & Imaging studies reviewed. (See chart for details)  Patient had near syncopal episode most likely vasovagal induced by his recent injection just prior to having symptoms. There is no cardiac ischemia EKG and troponins are normal. He has severe osteoarthritis degeneration to his left knee which she has a history of this however this is exacerbated from his falling to his knee x-ray showed no fracture. Is placed in knee immobilizer is given referral to orthopedics Dr. Carias regarding orthopedic clinic. Ice elevation and rest. Patient discharged in stable improved condition as above the home.    FINAL IMPRESSION  1. Acute near syncopal episode  2. Degenerative joint disease left knee  3. Contusion left knee  4. Diabetes mellitus by history  5. History of chronic back pain         Electronically signed by: Cal Mireles, 5/4/2018 8:06 PM

## 2018-05-05 NOTE — ED NOTES
Pt AOx4.  Pt given discharge instructions and pt verbalized understanding.  Pt assisted to lobby via wheelchair.

## 2018-05-05 NOTE — DISCHARGE INSTRUCTIONS
Near-Syncope  Introduction  Near-syncope is when you suddenly get weak or dizzy, or you feel like you might pass out (faint). During an episode of near-syncope, you may:  · Feel dizzy or light-headed.  · Feel sick to your stomach (nauseous).  · See all white or all black.  · Have cold, clammy skin.  If you passed out, get help right away.Call your local emergency services (031 in the [place] Do not drive yourself to the hospital.  Follow these instructions at home:  Pay attention to any changes in your symptoms. Take these actions to help with your condition:  · Have someone stay with you until you feel stable.  · Do not drive, use machinery, or play sports until your doctor says it is okay.  · Keep all follow-up visits as told by your doctor. This is important.  · If you start to feel like you might pass out, lie down right away and raise (elevate) your feet above the level of your heart. Breathe deeply and steadily. Wait until all of the symptoms are gone.  · Drink enough fluid to keep your pee (urine) clear or pale yellow.  · If you are taking blood pressure or heart medicine, get up slowly and spend many minutes getting ready to sit and then stand. This can help with dizziness.  · Take over-the-counter and prescription medicines only as told by your doctor.  Get help right away if:  · You have a very bad headache.  · You have unusual pain in your chest, tummy, or back.  · You are bleeding from your mouth or rectum.  · You have black or tarry poop (stool).  · You have a very fast or uneven heartbeat (palpitations).  · You pass out one time or more than once.  · You have jerky movements that you cannot control (seizure).  · You are confused.  · You have trouble walking.  · You are very weak.  · You have vision problems.  These symptoms may be an emergency. Do not wait to see if the symptoms will go away. Get medical help right away. Call your local emergency services (611 in the [place] Do not drive yourself to the  [facility].   This information is not intended to replace advice given to you by your health care provider. Make sure you discuss any questions you have with your health care provider.  Document Released: 06/05/2009 Document Revised: 05/25/2017 Document Reviewed: 08/31/2016  © 2017 Elsevier      Wear and Tear Disorders of the Knee (Arthritis, Osteoarthritis)  Everyone will experience wear and tear injuries (arthritis, osteoarthritis) of the knee. These are the changes we all get as we age. They come from the joint stress of daily living. The amount of cartilage damage in your knee and your symptoms determine if you need surgery. Mild problems require approximately two months recovery time. More severe problems take several months to recover. With mild problems, your surgeon may find worn and rough cartilage surfaces. With severe changes, your surgeon may find cartilage that has completely worn away and exposed the bone. Loose bodies of bone and cartilage, bone spurs (excess bone growth), and injuries to the menisci (cushions between the large bones of your leg) are also common. All of these problems can cause pain.  For a mild wear and tear problem, rough cartilage may simply need to be shaved and smoothed. For more severe problems with areas of exposed bone, your surgeon may use an instrument for roughing up the bone surfaces to stimulate new cartilage growth. Loose bodies are usually removed. Torn menisci may be trimmed or repaired.  ABOUT THE ARTHROSCOPIC PROCEDURE  Arthroscopy is a surgical technique. It allows your orthopedic surgeon to diagnose and treat your knee injury with accuracy. The surgeon looks into your knee through a small scope. The scope is like a small (pencil-sized) telescope. Arthroscopy is less invasive than open knee surgery. You can expect a more rapid recovery. After the procedure, you will be moved to a recovery area until most of the effects of the medication have worn off. Your caregiver  will discuss the test results with you.  RECOVERY  The severity of the arthritis and the type of procedure performed will determine recovery time. Other important factors include age, physical condition, medical conditions, and the type of rehabilitation program. Strengthening your muscles after arthroscopy helps guarantee a better recovery. Follow your caregiver's instructions. Use crutches, rest, elevate, ice, and do knee exercises as instructed. Your caregivers will help you and instruct you with exercises and other physical therapy required to regain your mobility, muscle strength, and functioning following surgery. Only take over-the-counter or prescription medicines for pain, discomfort, or fever as directed by your caregiver.   SEEK MEDICAL CARE IF:   · There is increased bleeding (more than a small spot) from the wound.  · You notice redness, swelling, or increasing pain in the wound.  · Pus is coming from wound.  · You develop an unexplained oral temperature above 102° F (38.9° C) , or as your caregiver suggests.  · You notice a foul smell coming from the wound or dressing.  · You have severe pain with motion of the knee.  SEEK IMMEDIATE MEDICAL CARE IF:   · You develop a rash.  · You have difficulty breathing.  · You have any allergic problems.  MAKE SURE YOU:   · Understand these instructions.  · Will watch your condition.  · Will get help right away if you are not doing well or get worse.  Document Released: 12/15/2001 Document Revised: 03/11/2013 Document Reviewed: 05/13/2009  [employer]® Patient Information ©2014 [employer].

## 2018-07-25 ENCOUNTER — APPOINTMENT (RX ONLY)
Dept: URBAN - METROPOLITAN AREA CLINIC 20 | Facility: CLINIC | Age: 73
Setting detail: DERMATOLOGY
End: 2018-07-25

## 2018-07-25 DIAGNOSIS — L82.0 INFLAMED SEBORRHEIC KERATOSIS: ICD-10-CM

## 2018-07-25 DIAGNOSIS — D18.0 HEMANGIOMA: ICD-10-CM

## 2018-07-25 DIAGNOSIS — L81.4 OTHER MELANIN HYPERPIGMENTATION: ICD-10-CM

## 2018-07-25 DIAGNOSIS — D22 MELANOCYTIC NEVI: ICD-10-CM

## 2018-07-25 DIAGNOSIS — L82.1 OTHER SEBORRHEIC KERATOSIS: ICD-10-CM

## 2018-07-25 DIAGNOSIS — L57.0 ACTINIC KERATOSIS: ICD-10-CM

## 2018-07-25 DIAGNOSIS — L57.8 OTHER SKIN CHANGES DUE TO CHRONIC EXPOSURE TO NONIONIZING RADIATION: ICD-10-CM

## 2018-07-25 PROBLEM — D22.5 MELANOCYTIC NEVI OF TRUNK: Status: ACTIVE | Noted: 2018-07-25

## 2018-07-25 PROBLEM — D18.01 HEMANGIOMA OF SKIN AND SUBCUTANEOUS TISSUE: Status: ACTIVE | Noted: 2018-07-25

## 2018-07-25 PROCEDURE — 99214 OFFICE O/P EST MOD 30 MIN: CPT | Mod: 25

## 2018-07-25 PROCEDURE — 17003 DESTRUCT PREMALG LES 2-14: CPT

## 2018-07-25 PROCEDURE — 17000 DESTRUCT PREMALG LESION: CPT | Mod: 59

## 2018-07-25 PROCEDURE — ? LIQUID NITROGEN

## 2018-07-25 PROCEDURE — 17110 DESTRUCTION B9 LES UP TO 14: CPT

## 2018-07-25 PROCEDURE — ? COUNSELING

## 2018-07-25 ASSESSMENT — LOCATION SIMPLE DESCRIPTION DERM
LOCATION SIMPLE: LEFT FOREARM
LOCATION SIMPLE: RIGHT LOWER BACK
LOCATION SIMPLE: CHEST
LOCATION SIMPLE: POSTERIOR SCALP
LOCATION SIMPLE: LEFT THIGH
LOCATION SIMPLE: RIGHT FOREARM
LOCATION SIMPLE: LEFT UPPER BACK
LOCATION SIMPLE: LEFT UPPER ARM
LOCATION SIMPLE: RIGHT UPPER BACK
LOCATION SIMPLE: RIGHT CHEEK
LOCATION SIMPLE: RIGHT THIGH
LOCATION SIMPLE: LEFT CHEEK
LOCATION SIMPLE: RIGHT UPPER ARM
LOCATION SIMPLE: SCALP
LOCATION SIMPLE: LEFT EYEBROW
LOCATION SIMPLE: LEFT FOREHEAD
LOCATION SIMPLE: UPPER BACK

## 2018-07-25 ASSESSMENT — LOCATION DETAILED DESCRIPTION DERM
LOCATION DETAILED: RIGHT INFERIOR UPPER BACK
LOCATION DETAILED: LEFT ANTERIOR DISTAL THIGH
LOCATION DETAILED: SUPERIOR THORACIC SPINE
LOCATION DETAILED: RIGHT SUPERIOR MEDIAL UPPER BACK
LOCATION DETAILED: LEFT INFERIOR UPPER BACK
LOCATION DETAILED: LEFT MEDIAL SUPERIOR CHEST
LOCATION DETAILED: LEFT CENTRAL EYEBROW
LOCATION DETAILED: RIGHT SUPERIOR PARIETAL SCALP
LOCATION DETAILED: LEFT ANTERIOR PROXIMAL UPPER ARM
LOCATION DETAILED: RIGHT CENTRAL MALAR CHEEK
LOCATION DETAILED: RIGHT ANTERIOR DISTAL THIGH
LOCATION DETAILED: LEFT INFERIOR CENTRAL MALAR CHEEK
LOCATION DETAILED: RIGHT PROXIMAL DORSAL FOREARM
LOCATION DETAILED: MID-OCCIPITAL SCALP
LOCATION DETAILED: RIGHT SUPERIOR LATERAL MIDBACK
LOCATION DETAILED: RIGHT ANTERIOR PROXIMAL UPPER ARM
LOCATION DETAILED: RIGHT MID-UPPER BACK
LOCATION DETAILED: LEFT LATERAL FOREHEAD
LOCATION DETAILED: LEFT PROXIMAL DORSAL FOREARM
LOCATION DETAILED: LEFT MID-UPPER BACK

## 2018-07-25 ASSESSMENT — LOCATION ZONE DERM
LOCATION ZONE: FACE
LOCATION ZONE: SCALP
LOCATION ZONE: LEG
LOCATION ZONE: TRUNK
LOCATION ZONE: ARM

## 2018-07-25 NOTE — PROCEDURE: LIQUID NITROGEN
Post-Care Instructions: I reviewed with the patient in detail post-care instructions. Patient is to wear sunprotection, and avoid picking at any of the treated lesions. Pt may apply Vaseline to crusted or scabbing areas.
Duration Of Freeze Thaw-Cycle (Seconds): 4
Detail Level: Detailed
Render Post-Care Instructions In Note?: no
Consent: The patient's consent was obtained including but not limited to risks of crusting, scabbing, blistering, scarring, darker or lighter pigmentary change, recurrence, incomplete removal and infection.
Medical Necessity Information: It is in your best interest to select a reason for this procedure from the list below. All of these items fulfill various CMS LCD requirements except the new and changing color options.
Medical Necessity Clause: This procedure was medically necessary because the lesions that were treated were:

## 2021-01-14 DIAGNOSIS — Z23 NEED FOR VACCINATION: ICD-10-CM
